# Patient Record
Sex: MALE | Race: ASIAN | Employment: FULL TIME | URBAN - METROPOLITAN AREA
[De-identification: names, ages, dates, MRNs, and addresses within clinical notes are randomized per-mention and may not be internally consistent; named-entity substitution may affect disease eponyms.]

---

## 2022-10-11 ENCOUNTER — HOSPITAL ENCOUNTER (OUTPATIENT)
Dept: PHYSICAL THERAPY | Age: 57
Discharge: HOME | End: 2022-10-11
Payer: OTHER GOVERNMENT

## 2022-10-11 PROCEDURE — 97535 SELF CARE MNGMENT TRAINING: CPT

## 2022-10-11 PROCEDURE — 97162 PT EVAL MOD COMPLEX 30 MIN: CPT

## 2022-10-11 NOTE — PROGRESS NOTES
PHYSICAL THERAPY - DAILY TREATMENT NOTE    Patient Name: Angelito Hogue        Date: 10/11/2022  : 1965   yes Patient  Verified  Visit #:      12  Insurance: Payor: LAURA / Plan: Daniel Cueto 74 / Product Type:  /      In time: 4709 Out time: 100   Total Treatment Time: 30     Medicare/[facility] Time Tracking (below)   Total Timed Codes (min):  na 1:1 Treatment Time:  na     TREATMENT AREA =  Low back pain [M54.50]    SUBJECTIVE  Pain Level (on 0 to 10 scale):  8  / 10   Medication Changes/New allergies or changes in medical history, any new surgeries or procedures?    no  If yes, update Summary List   Subjective Functional Status/Changes:  []  No changes reported     See POC          OBJECTIVE      10 min Self Care: Discussed importance of scheduling MRI, prognosis, diagnosis   Rationale:    increase ROM to improve the patients ability to perform walking activities. Billed With/As:   [] TE   [] TA   [] Neuro   [x] Self Care Patient Education: [x] Review HEP    [] Progressed/Changed HEP based on:   [] positioning   [] body mechanics   [] transfers   [] heat/ice application    [] other:      Other Objective/Functional Measures:    See POC     Post Treatment Pain Level (on 0 to 10) scale:   8  / 10     ASSESSMENT  Assessment/Changes in Function:     See POC     []  See Progress Note/Recertification   Patient will continue to benefit from skilled PT services to modify and progress therapeutic interventions, address functional mobility deficits, address ROM deficits, address strength deficits, analyze and address soft tissue restrictions, analyze and cue movement patterns, analyze and modify body mechanics/ergonomics, and assess and modify postural abnormalities to attain remaining goals.    Progress toward goals / Updated goals:    See POC     PLAN  [x]  Upgrade activities as tolerated yes Continue plan of care   []  Discharge due to :    []  Other:      Therapist: Elaine Pires, PT    Date: 10/11/2022 Time: 1:09 PM     Future Appointments   Date Time Provider Patrice Silverman   10/17/2022  5:20 PM Aryan Larkin [facility]

## 2022-10-11 NOTE — THERAPY EVALUATION
[address] J[place] Hernandez, [facility]       Phone: [phone]  Fax: [phone] / 29[address] [facility]  Patient Name: Kathrin Menendez : 1965   Medical   Diagnosis: Lumbar Spine Pain Treatment Diagnosis: Low back pain [M54.50]   Onset Date: Chronic     Referral Source: LANIE Chavez Start of Care [facility]): 10/11/2022   Prior Hospitalization: See medical history Provider #: [id number]   Prior Level of Function: Chronic history of difficulty with bending, lifting, carrying activities; difficulty with prolonged walking, standing and sitting   Comorbidities: High Cholesterol   Medications: Verified on Patient Summary List   The Plan of Care and following information is based on the information from the initial evaluation.   ===========================================================================================  Assessment / key information:  Patient is a 56year old male presenting to therapy with signs and symptoms consistent with chronic lumbar spine pain. Patient states his symptoms likely started while he was in the service in the [place] and have gradually progressed. Patient states his pain is typically on and off but feels like he recently exacerbated it. Patient has received x-rays but is unsure of the results and was referred for an MRI but hasn't schedule it yet. Patient notes he experiences pain down both legs and sometimes cramping in his legs as well. Patient has not received any injections. Patient reports increased difficulty with prolonged walking, standing, sitting, bending, lifting, carrying activities. Patient notes symptoms feel better with rest, heating pad, medication. Patient rates pain at worst 8/10 and 8/10 at best.   Objective Data:  Inspection-Patient ambulates with slight forward trunk lean, reduced stance time on the L and reduced push off on the L. Patient sits with reduced weight/pressure on the R LE and trunk lean to the L. Lumbar Spine AROM: flex hands to proximal thighs (P!), ext to neutral (P! With cramping into B LE's). Strength Testing: Hip flex R 3+/5, L 3+/5; Knee Flex R 4-/5 (P!), L 4-/5 (P!); ext R 4-/5 (P!), L 4-5 (P!); Ankle DF R 4/5, L 4/5. Flexibility Testing: moderate restriction and pain reported during PF testing, reduced hip flexion PROM with pain at end of available range. Tenderness with light palpation of B lumbar paraspinals and QL. FOTO: 37/100. Patient educated on diagnosis, prognosis, POC and HEP. Patient issued copy of HEP and denied additional questions. Patient will benefit from skilled PT in order to address these impairments and functional limitations.   ===========================================================================================  Eval Complexity: History MEDIUM  Complexity : 1-2 comorbidities / personal factors will impact the outcome/ POC ;  Examination  HIGH Complexity : 4+ Standardized tests and measures addressing body structure, function, activity limitation and / or participation in recreation ; Presentation MEDIUM Complexity : Evolving with changing characteristics ;   Decision Making MEDIUM Complexity : FOTO score of 26-74; Overall Complexity MEDIUM  Problem List: pain affecting function, decrease ROM, decrease strength, impaired gait/ balance, decrease ADL/ functional abilitiies, decrease activity tolerance, and decrease flexibility/ joint mobility   Treatment Plan may include any combination of the following: Therapeutic exercise, Neuromuscular reeducation, Manual therapy, Therapeutic activity, Self care/home management, and Electric stim unattended   Patient / Family readiness to learn indicated by: asking questions, trying to perform skills and interest  Persons(s) to be included in education: patient (P)  Barriers to Learning/Limitations: no  Measures taken:    Patient Goal (s): Reduce pain   Patient self reported health status: poor  Rehabilitation Potential: fair  Short Term Goals: To be accomplished in  3  weeks:  Patient will demonstrate independence with HEP for self management of symptoms. Patient will report reduction in pain at worst to 5-6/10 in order to improve tolerance to seated activities. Long Term Goals: To be accomplished in  6  weeks:  Patient will improve FOTO to >/= 52/100 in order to improve quality of life. Patient will improve lumbar spine AROM to WNL for all planes in order to improve tolerance to lifting activities. Patient will report reduction in pain at worst to 3-4/10 in order to improve tolerance to household activiites. Frequency / Duration:   Patient to be seen  2  times per week for 6  weeks:  Patient / Caregiver education and instruction: self care, activity modification, and exercises  G-Codes (GP): dc  Therapist Signature: Mary Sheets PT Date: 75/85/6848   Certification Period: 10/11/22-1/5/23 Time: 1:00 PM   ===========================================================================================  I certify that the above Physical Therapy Services are being furnished while the patient is under my care. I agree with the treatment plan and certify that this therapy is necessary. Physician Signature:        Date:       Time:     Please sign and return to [facility] or you may fax the signed copy to [phone]. Thank you.

## 2022-10-17 ENCOUNTER — HOSPITAL ENCOUNTER (OUTPATIENT)
Dept: PHYSICAL THERAPY | Age: 57
Discharge: HOME | End: 2022-10-17
Payer: OTHER GOVERNMENT

## 2022-10-17 PROCEDURE — 97140 MANUAL THERAPY 1/> REGIONS: CPT

## 2022-10-17 PROCEDURE — 97530 THERAPEUTIC ACTIVITIES: CPT

## 2022-10-17 PROCEDURE — 97110 THERAPEUTIC EXERCISES: CPT

## 2022-10-17 NOTE — PROGRESS NOTES
PHYSICAL THERAPY - DAILY TREATMENT NOTE    Patient Name: Katherine Dias        Date: 10/17/2022  : 1965   yes Patient  Verified  Visit #:   2   of   12  Insurance: Payor: LAURA / Plan: Daniel Cueto 74 / Product Type:  /      In time: 5:14 Out time: 5:40   Total Treatment Time: 26     Medicare/[facility] Time Tracking (below)   Total Timed Codes (min):  26 1:1 Treatment Time:  26     TREATMENT AREA =  Low back pain [M54.50]    SUBJECTIVE  Pain Level (on 0 to 10 scale):  7-8   10   Medication Changes/New allergies or changes in medical history, any new surgeries or procedures?    no  If yes, update Summary List   Subjective Functional Status/Changes:  []  No changes reported     You notice my body is croocked. On and off. This morning I accidentally bent inccorectly. Pain on right side         OBJECTIVE  Modalities Rationale:     na    8 min Therapeutic Exercise:  [x]  See flow sheet   Rationale:      increase ROM and increase strength to improve the patients ability to tolerate prolonged ambulation     10 min Manual Therapy: STM L/S paraspinals, TrP release piriformis   Rationale:      decrease pain, increase ROM, increase tissue extensibility and decrease trigger points to improve patient's ability to perform standing ADLs  The manual therapy interventions were performed at a separate and distinct time from the therapeutic activities interventions. 8 min Therapeutic Activity: Education in anatomy and physiology of the lumbar spine. Review of common causes of low back pain - postural stressors prolonged sitting in slouched position, prolonged forward bending, poor lifting mechanics, and standing/sleeping for prolonged periods in a poor position. Review of centralization and peripheralization principles as well as L/S radicular patterns. If the soft inside of the disc bulges excessively, the disc may become severely distorted.  This causes the vertebrae to tilt forward which prevents vertebrae from lining up properly during movement. Deviations in vertebral structure causes severe pain and partially or completely blocks L/S AROM. This severe distortion of the disc causes individuals to alter their posture and stand with the trunk off-center or bent forward. Recommended repeated SG 10-12 repetitions, 6-8 sets per day spread evenly throughout the day   Rationale:    Improve positioning of L/S to improve the patients ability to tolerate prolonged static resting positions. Billed With/As:   [x] TE   [] TA   [] Neuro   [] Self Care Patient Education: [x] Review HEP    [x] Progressed/Changed HEP based on:   [x] positioning   [x] body mechanics   [] transfers   [] heat/ice application    [] other:      Other Objective/Functional Measures:    Right Lateral shift (shoulders right)  Good response to side glide - reports decreased pain level post        Post Treatment Pain Level (on 0 to 10) scale:   6.5  / 10     ASSESSMENT  Assessment/Changes in Function:     Participation in therex limited by lateral shift and pain. Initiated side glide with instruction to perform every 2 hours. Plan to re-assess structure NV.      []  See Progress Note/Recertification   Patient will continue to benefit from skilled PT services to modify and progress therapeutic interventions, address functional mobility deficits, address ROM deficits, address strength deficits, analyze and address soft tissue restrictions, analyze and cue movement patterns, analyze and modify body mechanics/ergonomics, assess and modify postural abnormalities and instruct in home and community integration to attain remaining goals. Progress toward goals / Updated goals:    First visit after initial evaluation. Progress tx per POC.         PLAN  [x]  Upgrade activities as tolerated yes Continue plan of care   []  Discharge due to :    []  Other:      Therapist: Bashir Edge    Date: 10/17/2022 Time: 9:08 AM     Future Appointments   Date Time Provider Patrice Silverman   10/17/2022  5:20 PM Yaneth Boykin

## 2022-11-23 NOTE — THERAPY DISCHARGE
[address] [place] [address] PHYSICAL THERAPY  [address] [place] [address],[facility], [facility] - Phone: [phone]  Fax: [phone]    DISCHARGE NOTE  Patient Name:  : 1965   Treatment/Medical Diagnosis: Low back pain [M54.50]   Referral Source: LANIE Malik     Date of Initial Visit: 10/11/22 Attended Visits: 2 Missed Visits: 0       SUMMARY OF TREATMENT  Pt attended only initial evaluation and     1     follow-ups and then did not return. Therefore a formal reassessment of goals was not performed. RECOMMENDATIONS  Discontinue physical therapy due to patient not returning. If you have any questions/comments please contact us directly at [phone]. Thank you for allowing us to assist in the care of your patient. Therapist Signature:  Carlos Reyes PT Date: 22     Time: 8:00 AM